# Patient Record
Sex: FEMALE | Race: WHITE | NOT HISPANIC OR LATINO | Employment: FULL TIME | URBAN - METROPOLITAN AREA
[De-identification: names, ages, dates, MRNs, and addresses within clinical notes are randomized per-mention and may not be internally consistent; named-entity substitution may affect disease eponyms.]

---

## 2023-08-23 PROBLEM — N95.2 ATROPHIC VAGINITIS: Status: ACTIVE | Noted: 2023-08-23

## 2023-08-23 PROBLEM — K64.4 EXTERNAL HEMORRHOID: Status: ACTIVE | Noted: 2023-08-23

## 2023-08-23 PROBLEM — K59.09 CHRONIC CONSTIPATION: Status: ACTIVE | Noted: 2023-08-23

## 2023-08-23 PROBLEM — R14.0 BLOATING: Status: ACTIVE | Noted: 2023-08-23

## 2023-08-23 PROBLEM — R10.11 ABDOMINAL PAIN, CHRONIC, RIGHT UPPER QUADRANT: Status: ACTIVE | Noted: 2023-08-23

## 2023-08-23 PROBLEM — R19.7 DIARRHEA: Status: ACTIVE | Noted: 2023-08-23

## 2023-08-23 PROBLEM — G89.29 ABDOMINAL PAIN, CHRONIC, RIGHT UPPER QUADRANT: Status: ACTIVE | Noted: 2023-08-23

## 2023-08-23 PROBLEM — R10.13 EPIGASTRIC PAIN: Status: ACTIVE | Noted: 2023-08-23

## 2023-08-23 PROBLEM — R32 URINARY INCONTINENCE: Status: ACTIVE | Noted: 2023-08-23

## 2023-08-23 PROBLEM — K44.9 HIATAL HERNIA: Status: ACTIVE | Noted: 2023-08-23

## 2023-08-23 PROBLEM — R15.9 FECAL INCONTINENCE: Status: ACTIVE | Noted: 2023-08-23

## 2023-08-23 PROBLEM — M85.80 OSTEOPENIA: Status: ACTIVE | Noted: 2023-08-23

## 2023-08-23 PROBLEM — K22.10 ESOPHAGEAL ULCER: Status: ACTIVE | Noted: 2023-08-23

## 2023-08-23 PROBLEM — K21.9 GERD (GASTROESOPHAGEAL REFLUX DISEASE): Status: ACTIVE | Noted: 2023-08-23

## 2023-08-23 PROBLEM — K64.8 PROLAPSED INTERNAL HEMORRHOIDS: Status: ACTIVE | Noted: 2023-08-23

## 2023-08-23 RX ORDER — CYCLOBENZAPRINE HCL 5 MG
1 TABLET ORAL 3 TIMES DAILY
COMMUNITY
Start: 2020-12-23

## 2023-08-23 RX ORDER — ACETAMINOPHEN 500 MG
1 TABLET ORAL DAILY
COMMUNITY

## 2023-08-23 RX ORDER — DEXTROAMPHETAMINE SACCHARATE, AMPHETAMINE ASPARTATE, DEXTROAMPHETAMINE SULFATE AND AMPHETAMINE SULFATE 2.5; 2.5; 2.5; 2.5 MG/1; MG/1; MG/1; MG/1
1 TABLET ORAL SEE ADMIN INSTRUCTIONS
COMMUNITY
Start: 2017-05-08

## 2023-08-23 RX ORDER — ACETAMINOPHEN 325 MG/1
650 TABLET ORAL EVERY 6 HOURS
COMMUNITY
Start: 2020-12-23

## 2023-08-23 RX ORDER — IBUPROFEN 600 MG/1
1 TABLET ORAL EVERY 6 HOURS
COMMUNITY
Start: 2020-12-23 | End: 2024-01-15 | Stop reason: WASHOUT

## 2023-08-23 RX ORDER — PANTOPRAZOLE SODIUM 40 MG/1
1 TABLET, DELAYED RELEASE ORAL 2 TIMES DAILY
COMMUNITY
End: 2024-01-15 | Stop reason: WASHOUT

## 2023-08-23 RX ORDER — ESCITALOPRAM OXALATE 20 MG/1
1 TABLET ORAL DAILY
COMMUNITY

## 2023-08-23 RX ORDER — DICYCLOMINE HYDROCHLORIDE 20 MG/1
1 TABLET ORAL
COMMUNITY
Start: 2019-08-09 | End: 2024-01-15 | Stop reason: WASHOUT

## 2023-08-23 RX ORDER — DOCUSATE SODIUM 100 MG/1
1 CAPSULE, LIQUID FILLED ORAL 2 TIMES DAILY
COMMUNITY
Start: 2020-12-23 | End: 2024-01-15 | Stop reason: WASHOUT

## 2023-08-23 RX ORDER — DICYCLOMINE HYDROCHLORIDE 10 MG/1
1 CAPSULE ORAL SEE ADMIN INSTRUCTIONS
COMMUNITY
Start: 2021-06-23 | End: 2024-01-15 | Stop reason: WASHOUT

## 2023-10-11 ENCOUNTER — APPOINTMENT (OUTPATIENT)
Dept: GASTROENTEROLOGY | Facility: CLINIC | Age: 73
End: 2023-10-11
Payer: COMMERCIAL

## 2023-10-12 ENCOUNTER — APPOINTMENT (OUTPATIENT)
Dept: GASTROENTEROLOGY | Facility: CLINIC | Age: 73
End: 2023-10-12
Payer: COMMERCIAL

## 2024-01-15 ENCOUNTER — TELEMEDICINE (OUTPATIENT)
Dept: PRIMARY CARE | Facility: CLINIC | Age: 74
End: 2024-01-15
Payer: COMMERCIAL

## 2024-01-15 DIAGNOSIS — J06.9 UPPER RESPIRATORY TRACT INFECTION, UNSPECIFIED TYPE: Primary | ICD-10-CM

## 2024-01-15 PROCEDURE — 99213 OFFICE O/P EST LOW 20 MIN: CPT | Performed by: STUDENT IN AN ORGANIZED HEALTH CARE EDUCATION/TRAINING PROGRAM

## 2024-01-15 RX ORDER — DOXYCYCLINE 100 MG/1
100 CAPSULE ORAL 2 TIMES DAILY
Qty: 14 CAPSULE | Refills: 0 | Status: SHIPPED | OUTPATIENT
Start: 2024-01-15 | End: 2024-01-22

## 2024-01-15 NOTE — PROGRESS NOTES
Subjective   Patient ID: Jacque Bautista is a 73 y.o. female who presents for No chief complaint on file..      HPI  1.  URI  Reports symptoms of productive cough, Chest congestion, and sinus congestion as well as malaise.  Symptoms started after Vicky  Went to urgent care for symptoms  Was started on Augmentin but didn't finish the course because she got diarrhea 3 days in and did not feel better  Has not felt better since she initially got sick    Review of Systems  All pertinent positive symptoms are included in the history of present illness.    All other systems have been reviewed and are negative and noncontributory to this patient's current ailments.    Past Medical History:   Diagnosis Date    Personal history of other endocrine, nutritional and metabolic disease     History of diabetes mellitus    Personal history of other mental and behavioral disorders     History of depression     Past Surgical History:   Procedure Laterality Date    RHINOPLASTY  11/12/2015    Rhinoplasty    TONSILLECTOMY  11/06/2015    Tonsillectomy        No family history on file.  Immunization History   Administered Date(s) Administered    Flu vaccine, quadrivalent, high-dose, preservative free, age 65y+ (FLUZONE) 09/27/2020, 10/23/2021, 10/29/2022    Influenza, High Dose Seasonal, Preservative Free 10/26/2018, 10/28/2019    Teresa SARS-CoV-2 Vaccination 05/09/2021    Pneumococcal polysaccharide vaccine, 23-valent, age 2 years and older (PNEUMOVAX 23) 12/22/2020, 12/23/2020     Current Outpatient Medications   Medication Instructions    acetaminophen (TYLENOL) 650 mg, oral, Every 6 hours    amphetamine-dextroamphetamine (Adderall) 10 mg tablet 1 tablet, oral, See admin instructions, Take 1 capsule in the morning and 1 tablet at noon    cholecalciferol (Vitamin D-3) 50 mcg (2,000 unit) capsule 1 capsule, oral, Daily    cyclobenzaprine (Flexeril) 5 mg tablet 1 tablet, oral, 3 times daily    dicyclomine (Bentyl) 10 mg capsule 1  capsule, oral, See admin instructions, UP to 2 times a day or up to 4 times a day as needed for abdominal cramping     dicyclomine (Bentyl) 20 mg tablet 1 tablet, oral, 3 times daily with meals    docusate sodium (Colace) 100 mg capsule 1 capsule, oral, 2 times daily    escitalopram (Lexapro) 20 mg tablet 1 tablet, oral, Daily    escitalopram oxalate (LEXAPRO ORAL) 30 mg, oral, Daily    ibuprofen 600 mg tablet 1 tablet, oral, Every 6 hours    pantoprazole (ProtoNix) 40 mg EC tablet 1 tablet, oral, 2 times daily    ZINC ORAL oral, Daily, gummies     No Known Allergies    Objective   There were no vitals filed for this visit.  There is no height or weight on file to calculate BMI.    BP Readings from Last 3 Encounters:   08/30/23 (!) 141/93   07/21/23 150/65   07/10/23 120/78      Wt Readings from Last 3 Encounters:   08/30/23 69.5 kg (153 lb 5 oz)   07/21/23 70.6 kg (155 lb 9.6 oz)   07/10/23 72.6 kg (160 lb)        No visits with results within 1 Month(s) from this visit.   Latest known visit with results is:   [facility] Encounter on 06/24/2021   Component Date Value    Yersinia Enterocolitica 06/24/2021 NOT DETECTED     Campylobacter gp. 06/24/2021 NOT DETECTED     Salmonella sp. 06/24/2021 NOT DETECTED     Shigella sp. 06/24/2021 NOT DETECTED     Vibrio grp. 06/24/2021 NOT DETECTED     Shiga Toxin 1 06/24/2021 NOT DETECTED     Shiga Toxin 2 06/24/2021 NOT DETECTED     Norovirus GI/GII 06/24/2021 NOT DETECTED     Rotavirus A 06/24/2021 NOT DETECTED      Physical Exam  CONSTITUTIONAL - well nourished, well developed, looks like stated age, in no acute distress, not ill-appearing, and not tired appearing  SKIN - normal skin color and pigmentation  EYES - normal external exam  LUNGS - breathing comfortably, no dyspnea  EXTREMITIES - no deformities noticeable  NEUROLOGICAL - oriented and no focal signs  PSYCHIATRIC - alert, pleasant and cordial, age-appropriate, not anxious or depressed appearing     Assessment/Plan    URI  Doxycycline 100mg BID x 7 days prescribed

## 2024-01-16 NOTE — ADDENDUM NOTE
Addended by: DEVON WILLINGHAM on: 1/15/2024 10:48 PM     Modules accepted: Orders, Level of Service

## 2024-08-21 ENCOUNTER — APPOINTMENT (OUTPATIENT)
Dept: SURGERY | Facility: CLINIC | Age: 74
End: 2024-08-21
Payer: COMMERCIAL

## 2024-08-21 VITALS
BODY MASS INDEX: 26.57 KG/M2 | HEART RATE: 88 BPM | DIASTOLIC BLOOD PRESSURE: 85 MMHG | SYSTOLIC BLOOD PRESSURE: 158 MMHG | OXYGEN SATURATION: 93 % | WEIGHT: 150 LBS

## 2024-08-21 DIAGNOSIS — K62.3 RECTAL PROLAPSE: ICD-10-CM

## 2024-08-21 DIAGNOSIS — R15.9 INCONTINENCE OF FECES, UNSPECIFIED FECAL INCONTINENCE TYPE: Primary | ICD-10-CM

## 2024-08-21 PROCEDURE — 99213 OFFICE O/P EST LOW 20 MIN: CPT | Performed by: SURGERY

## 2024-08-21 NOTE — PROGRESS NOTES
General Surgery Follow-Up Note    Referring Provider:   Fuentes Pathak DO      Chief Complaint:  Rectal prolapse    History of Present Illness:  This is a 74 y.o. female who presents for concerns for rectal prolapse.  She was last seen 1 year ago.  At that time, we discussed her chronic intermittent constipation and diarrhea.  We discussed this prolapsing tissue, but discussed that I believed she was having prolapsing grade 3-4 internal hemorrhoids at the time.  However, she has not had any hemorrhoid intervention or started any fiber supplements.  She is also convinced that this is actual rectal prolapse.  She reports that she often ices her anus to reduce tissue.  She reports that her last colonoscopy was in 2015.      Vitals:   Vitals:    08/21/24 1452   BP: 158/85   Pulse: 88   SpO2: 93%   Weight: 68 kg (150 lb)         Physical Exam:  General: No acute distress. Sitting up in bed.   Neuro: Alert and oriented ×3. Follows commands.  Head: Atraumatic  Eyes: Pupils equal reactive to light. Extraocular motions intact.  Ears: Hears normal speaking voice.  Mouth, Nose, Throat: Mucous membranes moist.  Normal dentition.  Neck: Supple. No appreciable masses.  Chest: No crepitus.  No appreciable scars.  Heart: Regular rate and rhythm.  Lung: Clear to auscultation bilaterally.  Vascular: No carotid bruits.  Palpable radial pulses bilaterally.  Abdomen: Soft. Nondistended. Nontender.  No appreciable hernias or scars.  Rectal: Enlarged right anterior hemorrhoid column.  Musculoskeletal: Moves all extremities.  Normal range of motion.  Lymphatic: No palpable lymph nodes.  Skin: No rashes or lesions.  Psychological: Normal affect    Labs:  None      Imaging:   None      Assessment:  This is a 74 y.o.-year-old female who presents for concerns for rectal prolapse.  We discussed that this could still be large prolapsing internal hemorrhoids.  However, cannot completely rule out rectal prolapse at this time.  We discussed that in  general she would need a colonoscopy.  However, given the concerns for rectal prolapse, I would recommend that she see a colorectal surgeon for workup.      Plan:  -- Referral to colorectal surgery    Basilio Pascal MD  General Surgery  Office: [phone]  Fax: [phone]  3:08 PM  08/21/24        Past Medical History:  Past Medical History:   Diagnosis Date    Personal history of other endocrine, nutritional and metabolic disease     History of diabetes mellitus    Personal history of other mental and behavioral disorders     History of depression        Past Surgical History:  Past Surgical History:   Procedure Laterality Date    RHINOPLASTY  11/12/2015    Rhinoplasty    TONSILLECTOMY  11/06/2015    Tonsillectomy        Medications:  Current Outpatient Medications   Medication Instructions    acetaminophen (TYLENOL) 650 mg, oral, Every 6 hours    amphetamine-dextroamphetamine (Adderall) 10 mg tablet 1 tablet, oral, See admin instructions, Take 1 capsule in the morning and 1 tablet at noon    cholecalciferol (Vitamin D-3) 50 mcg (2,000 unit) capsule 1 capsule, oral, Daily    cyclobenzaprine (Flexeril) 5 mg tablet 1 tablet, oral, 3 times daily    escitalopram (Lexapro) 20 mg tablet 1 tablet, oral, Daily    escitalopram oxalate (LEXAPRO ORAL) 30 mg, oral, Daily    ZINC ORAL oral, Daily, gummies        Allergies:  No Known Allergies     Family History:  No family history on file.     Social History:  Social History     Socioeconomic History    Marital status:         Review of Systems:  A complete 12 point review of systems was performed and is negative except as noted in the history of present illness.

## 2024-10-25 ENCOUNTER — OFFICE VISIT (OUTPATIENT)
Dept: SURGERY | Facility: CLINIC | Age: 74
End: 2024-10-25
Payer: COMMERCIAL

## 2024-10-25 VITALS
BODY MASS INDEX: 26.04 KG/M2 | TEMPERATURE: 96.8 F | DIASTOLIC BLOOD PRESSURE: 89 MMHG | HEART RATE: 87 BPM | WEIGHT: 147 LBS | SYSTOLIC BLOOD PRESSURE: 161 MMHG

## 2024-10-25 DIAGNOSIS — K62.3 RECTAL PROLAPSE: ICD-10-CM

## 2024-10-25 DIAGNOSIS — R15.9 INCONTINENCE OF FECES, UNSPECIFIED FECAL INCONTINENCE TYPE: ICD-10-CM

## 2024-10-25 PROCEDURE — 46600 DIAGNOSTIC ANOSCOPY SPX: CPT | Performed by: NURSE PRACTITIONER

## 2024-10-25 PROCEDURE — 99213 OFFICE O/P EST LOW 20 MIN: CPT | Performed by: NURSE PRACTITIONER

## 2024-10-25 ASSESSMENT — PAIN SCALES - GENERAL: PAINLEVEL_OUTOF10: 0-NO PAIN

## 2024-10-25 NOTE — PROGRESS NOTES
History Of Present Illness  Jacque Bautista is a 74 y.o. female presenting with fecal incontinence and a rectal prolapse.      Colonoscopy 2015 negative    She is seeing Dr. Richardson in December for a rectal prolapse.    She has been having anal  pain over the past months with minimal rectal bleeding.  She will have leakage of stool daily but no full accident of stool.  She has a lot of mucus drainage daily.      She will have a BM 2x/day and takes an Nani capsule daily to help her have better BM's.  She will take Colace at times.  No c/o any diarrhea.      No hx of any perianal surgeries.  She has had a few rubber band ligation procedures many years ago.  She has had 2 vaginal births with no tears.       Past Medical History  She has a past medical history of Personal history of other endocrine, nutritional and metabolic disease and Personal history of other mental and behavioral disorders.    Surgical History  She has a past surgical history that includes Tonsillectomy (11/06/2015) and Rhinoplasty (11/12/2015).     Social History  She has no history on file for tobacco use, alcohol use, and drug use.    Family History  No family history on file.     Allergies  Patient has no known allergies.    Review of Systems   All other systems reviewed and are negative.       Physical Exam  Constitutional:       Appearance: Normal appearance.   HENT:      Head: Normocephalic and atraumatic.   Pulmonary:      Effort: Pulmonary effort is normal.   Musculoskeletal:         General: Normal range of motion.   Skin:     General: Skin is warm and dry.   Neurological:      General: No focal deficit present.      Mental Status: She is alert and oriented to person, place, and time.   Psychiatric:         Mood and Affect: Mood normal.         Behavior: Behavior normal.          Anoscopy    Date/Time: 10/25/2024 3:01 PM    Performed by: CHRIS Casey  Authorized by: CHRIS Casey    Consent:     Consent obtained:   Verbal    Consent given by:  Patient    Risks, benefits, and alternatives were discussed: yes    Universal protocol:     Procedure explained and questions answered to patient or proxy's satisfaction: yes      Patient identity confirmed:  Verbally with patient  Post-procedure details:     Procedure completion:  Tolerated  Comments:      No external hemorrhoids.  Weak tone on KIM, no pain.  ON anoscopy, looking in 360 degrees, no irritation of the internal hemorrhoids.  She has redundant rectal tissue.  No pain at this time or active bleeding.  I also had her sit on the toilet to strain and she was able to reproduce a full rectal prolapse.  It was easily reproducible.      Assessment/Plan   Jacque has a full rectal prolapse.  She will continue to keep her stools soft so that she does not have to strain.  She has an appointment to see Dr. Richardson soon to talk about surgery.  We talked about the s/sx of an incarcerated rectal prolapse and she understands.  She will call with any issues and will follow up with Dr. Richardson.       Prachi Mccarty, APRN-CNP

## 2024-10-30 ENCOUNTER — APPOINTMENT (OUTPATIENT)
Dept: SURGERY | Facility: CLINIC | Age: 74
End: 2024-10-30
Payer: COMMERCIAL

## 2024-11-12 ENCOUNTER — APPOINTMENT (OUTPATIENT)
Dept: SURGERY | Facility: CLINIC | Age: 74
End: 2024-11-12
Payer: COMMERCIAL

## 2024-12-11 ENCOUNTER — APPOINTMENT (OUTPATIENT)
Dept: SURGERY | Facility: CLINIC | Age: 74
End: 2024-12-11
Payer: COMMERCIAL

## 2024-12-31 ENCOUNTER — OFFICE VISIT (OUTPATIENT)
Dept: URGENT CARE | Age: 74
End: 2024-12-31
Payer: COMMERCIAL

## 2024-12-31 VITALS
RESPIRATION RATE: 17 BRPM | HEART RATE: 83 BPM | TEMPERATURE: 97.2 F | SYSTOLIC BLOOD PRESSURE: 159 MMHG | OXYGEN SATURATION: 96 % | DIASTOLIC BLOOD PRESSURE: 85 MMHG | WEIGHT: 140 LBS | BODY MASS INDEX: 24.8 KG/M2

## 2024-12-31 DIAGNOSIS — N39.0 URINARY TRACT INFECTION WITH HEMATURIA, SITE UNSPECIFIED: Primary | ICD-10-CM

## 2024-12-31 DIAGNOSIS — R31.9 URINARY TRACT INFECTION WITH HEMATURIA, SITE UNSPECIFIED: Primary | ICD-10-CM

## 2024-12-31 DIAGNOSIS — R30.0 DYSURIA: ICD-10-CM

## 2024-12-31 LAB
POC APPEARANCE, URINE: ABNORMAL
POC BILIRUBIN, URINE: NEGATIVE
POC BLOOD, URINE: ABNORMAL
POC COLOR, URINE: YELLOW
POC GLUCOSE, URINE: NEGATIVE MG/DL
POC KETONES, URINE: NEGATIVE MG/DL
POC LEUKOCYTES, URINE: ABNORMAL
POC NITRITE,URINE: POSITIVE
POC PH, URINE: 6 PH
POC PROTEIN, URINE: NEGATIVE MG/DL
POC SPECIFIC GRAVITY, URINE: 1.02
POC UROBILINOGEN, URINE: 0.2 EU/DL

## 2024-12-31 PROCEDURE — 1159F MED LIST DOCD IN RCRD: CPT

## 2024-12-31 PROCEDURE — 1160F RVW MEDS BY RX/DR IN RCRD: CPT

## 2024-12-31 PROCEDURE — 87086 URINE CULTURE/COLONY COUNT: CPT

## 2024-12-31 PROCEDURE — 87186 SC STD MICRODIL/AGAR DIL: CPT

## 2024-12-31 PROCEDURE — 99214 OFFICE O/P EST MOD 30 MIN: CPT

## 2024-12-31 PROCEDURE — 81003 URINALYSIS AUTO W/O SCOPE: CPT

## 2024-12-31 RX ORDER — CEPHALEXIN 500 MG/1
500 CAPSULE ORAL 2 TIMES DAILY
Qty: 14 CAPSULE | Refills: 0 | Status: SHIPPED | OUTPATIENT
Start: 2024-12-31 | End: 2025-01-07

## 2024-12-31 ASSESSMENT — ENCOUNTER SYMPTOMS: DYSURIA: 1

## 2024-12-31 NOTE — PROGRESS NOTES
Subjective   Patient ID: Jacque Bautista is a 74 y.o. female. They present today with a chief complaint of Difficulty Urinating (For 3 days, believes she may have a UTI, painful urination, urine is cloudy, seems to be worsening.).    History of Present Illness  HPI    Past Medical History  Allergies as of 12/31/2024    (No Known Allergies)       (Not in a hospital admission)       Past Medical History:   Diagnosis Date    Personal history of other endocrine, nutritional and metabolic disease     History of diabetes mellitus    Personal history of other mental and behavioral disorders     History of depression       Past Surgical History:   Procedure Laterality Date    RHINOPLASTY  11/12/2015    Rhinoplasty    TONSILLECTOMY  11/06/2015    Tonsillectomy            Review of Systems  Review of Systems   Genitourinary:  Positive for dysuria and urgency.   All other systems reviewed and are negative.                                 Objective    Vitals:    12/31/24 0830   BP: 159/85   BP Location: Left arm   Patient Position: Sitting   BP Cuff Size: Small adult   Pulse: 83   Resp: 17   Temp: 36.2 °C (97.2 °F)   TempSrc: Temporal   SpO2: 96%   Weight: 63.5 kg (140 lb)     No LMP recorded.    Physical Exam  Vitals reviewed.   Constitutional:       Appearance: Normal appearance.   HENT:      Head: Normocephalic and atraumatic.   Cardiovascular:      Rate and Rhythm: Normal rate and regular rhythm.      Pulses: Normal pulses.      Heart sounds: Normal heart sounds.   Pulmonary:      Effort: Pulmonary effort is normal.      Breath sounds: Normal breath sounds.   Abdominal:      General: Abdomen is flat. Bowel sounds are normal.      Palpations: Abdomen is soft.   Musculoskeletal:         General: Normal range of motion.      Cervical back: Normal range of motion.   Skin:     General: Skin is warm.      Capillary Refill: Capillary refill takes less than 2 seconds.   Neurological:      General: No focal deficit present.       Mental Status: She is alert and oriented to person, place, and time.   Psychiatric:         Mood and Affect: Mood normal.         Behavior: Behavior normal.         Procedures    Point of Care Test & Imaging Results from this visit  No results found for this visit on 12/31/24.   No results found.    Diagnostic study results (if any) were reviewed by CHRIS Pisano.    Assessment/Plan   Allergies, medications, history, and pertinent labs/EKGs/Imaging reviewed by CHRIS Pisano.     Medical Decision Making  74-year-old female reports for mild dysuria and urgency.  Denies CVA tenderness or back pain or abdominal pain.  Patient denies fever or chills.  On exam patient is in no acute distress vital signs are stable heart rate is regular there is no CVA tenderness abdomen is soft nontender bowel sounds are positive UA shows leukocytes positive and nitrites and mild blood.  Patient to start Keflex as directed.  Patient to go to the emergency department with worsening symptoms.  Will culture urine.  Patient left in stable condition.    Orders and Diagnoses  Diagnoses and all orders for this visit:  Dysuria  -     POCT UA Automated manually resulted      Medical Admin Record      Patient disposition: Home    Electronically signed by CHRIS Pisano  8:38 AM

## 2024-12-31 NOTE — PATIENT INSTRUCTIONS
Start Cephalexin as directed, go to emergency department with worsening symptoms, otherwise follow up with primary care doctor.

## 2025-01-04 LAB — BACTERIA UR CULT: ABNORMAL

## 2025-01-19 ENCOUNTER — OFFICE VISIT (OUTPATIENT)
Dept: URGENT CARE | Age: 75
End: 2025-01-19
Payer: COMMERCIAL

## 2025-01-19 VITALS
OXYGEN SATURATION: 95 % | WEIGHT: 140 LBS | DIASTOLIC BLOOD PRESSURE: 84 MMHG | TEMPERATURE: 97.3 F | HEART RATE: 59 BPM | BODY MASS INDEX: 24.8 KG/M2 | RESPIRATION RATE: 16 BRPM | SYSTOLIC BLOOD PRESSURE: 125 MMHG

## 2025-01-19 DIAGNOSIS — R35.0 URINARY FREQUENCY: Primary | ICD-10-CM

## 2025-01-19 LAB
POC APPEARANCE, URINE: CLEAR
POC BILIRUBIN, URINE: NEGATIVE
POC BLOOD, URINE: NEGATIVE
POC COLOR, URINE: ABNORMAL
POC GLUCOSE, URINE: NEGATIVE MG/DL
POC KETONES, URINE: NEGATIVE MG/DL
POC LEUKOCYTES, URINE: ABNORMAL
POC NITRITE,URINE: POSITIVE
POC PH, URINE: 6 PH
POC PROTEIN, URINE: NEGATIVE MG/DL
POC SPECIFIC GRAVITY, URINE: 1.02
POC UROBILINOGEN, URINE: 0.2 EU/DL

## 2025-01-19 PROCEDURE — 87086 URINE CULTURE/COLONY COUNT: CPT

## 2025-01-19 PROCEDURE — 87186 SC STD MICRODIL/AGAR DIL: CPT

## 2025-01-19 RX ORDER — SULFAMETHOXAZOLE AND TRIMETHOPRIM 800; 160 MG/1; MG/1
1 TABLET ORAL 2 TIMES DAILY
Qty: 14 TABLET | Refills: 0 | Status: SHIPPED | OUTPATIENT
Start: 2025-01-19 | End: 2025-01-26

## 2025-01-19 ASSESSMENT — ENCOUNTER SYMPTOMS: FREQUENCY: 1

## 2025-01-19 NOTE — PATIENT INSTRUCTIONS
Start Bactrim as directed, go to emergency department with worsening symptoms, follow up with primary care doctor.

## 2025-01-19 NOTE — PROGRESS NOTES
Subjective   Patient ID: Jacque Bautista is a 74 y.o. female. They present today with a chief complaint of Urinary Frequency (For 3  weeks).    History of Present Illness  HPI    Past Medical History  Allergies as of 01/19/2025    (No Known Allergies)       (Not in a hospital admission)       Past Medical History:   Diagnosis Date    Personal history of other endocrine, nutritional and metabolic disease     History of diabetes mellitus    Personal history of other mental and behavioral disorders     History of depression       Past Surgical History:   Procedure Laterality Date    RHINOPLASTY  11/12/2015    Rhinoplasty    TONSILLECTOMY  11/06/2015    Tonsillectomy            Review of Systems  Review of Systems   Genitourinary:  Positive for frequency and urgency.   All other systems reviewed and are negative.                                 Objective    Vitals:    01/19/25 0926   BP: 125/84   BP Location: Left arm   Patient Position: Sitting   BP Cuff Size: Large adult   Pulse: 59   Resp: 16   Temp: 36.3 °C (97.3 °F)   TempSrc: Oral   SpO2: 95%   Weight: 63.5 kg (140 lb)     No LMP recorded.    Physical Exam  Vitals reviewed.   Constitutional:       Appearance: Normal appearance.   HENT:      Head: Normocephalic and atraumatic.      Right Ear: Tympanic membrane, ear canal and external ear normal.      Left Ear: Tympanic membrane, ear canal and external ear normal.      Nose: Nose normal.      Mouth/Throat:      Mouth: Mucous membranes are moist.      Pharynx: Oropharynx is clear.   Eyes:      Extraocular Movements: Extraocular movements intact.      Conjunctiva/sclera: Conjunctivae normal.      Pupils: Pupils are equal, round, and reactive to light.   Cardiovascular:      Rate and Rhythm: Normal rate and regular rhythm.      Pulses: Normal pulses.   Pulmonary:      Effort: Pulmonary effort is normal.      Breath sounds: Normal breath sounds.   Abdominal:      General: Abdomen is flat. Bowel sounds are normal.       Palpations: Abdomen is soft.   Musculoskeletal:         General: Normal range of motion.      Cervical back: Normal range of motion.   Skin:     General: Skin is warm.      Capillary Refill: Capillary refill takes less than 2 seconds.   Neurological:      General: No focal deficit present.      Mental Status: She is alert and oriented to person, place, and time.   Psychiatric:         Mood and Affect: Mood normal.         Behavior: Behavior normal.         Procedures    Point of Care Test & Imaging Results from this visit  No results found for this visit on 01/19/25.   No results found.    Diagnostic study results (if any) were reviewed by CHRIS Pisano.    Assessment/Plan   Allergies, medications, history, and pertinent labs/EKGs/Imaging reviewed by CHRIS Pisano.     Medical Decision Making  74-year-old female presents for urinary frequency.  She was diagnosed with UTI couple weeks ago and took Keflex.  She reports the symptoms got better but then they returned urine culture came back resistant to cephazolin.  Will prescribe Bactrim take as directed twice a day patient denies any fever or chills.  Patient denies dysuria patient reports she feels fine except for urinary frequency at times.  On exam she is in no acute distress vital signs are stable heart rate is regular lungs are clear there is no CVA tenderness no flank pain no abdominal pain patient is nontoxic-appearing she is afebrile patient encouraged to follow-up with her primary care doctor and go to the emergency department with any worsening symptoms patient agrees with plan of care patient left in stable condition  Orders and Diagnoses  Diagnoses and all orders for this visit:  Urinary frequency  -     POCT UA Automated manually resulted      Medical Admin Record      Patient disposition: Home    Electronically signed by CHRIS Pisano  9:41 AM

## 2025-01-21 ENCOUNTER — TELEPHONE (OUTPATIENT)
Dept: URGENT CARE | Age: 75
End: 2025-01-21

## 2025-01-22 LAB — BACTERIA UR CULT: ABNORMAL

## 2025-02-03 ENCOUNTER — OFFICE VISIT (OUTPATIENT)
Dept: PRIMARY CARE | Facility: CLINIC | Age: 75
End: 2025-02-03
Payer: COMMERCIAL

## 2025-02-03 VITALS
WEIGHT: 136 LBS | SYSTOLIC BLOOD PRESSURE: 142 MMHG | BODY MASS INDEX: 24.09 KG/M2 | TEMPERATURE: 97.7 F | DIASTOLIC BLOOD PRESSURE: 80 MMHG | RESPIRATION RATE: 18 BRPM | HEART RATE: 87 BPM | OXYGEN SATURATION: 96 %

## 2025-02-03 DIAGNOSIS — L30.9 DERMATITIS: ICD-10-CM

## 2025-02-03 DIAGNOSIS — Z13.1 DIABETES MELLITUS SCREENING: ICD-10-CM

## 2025-02-03 DIAGNOSIS — R73.9 BLOOD GLUCOSE ELEVATED: ICD-10-CM

## 2025-02-03 DIAGNOSIS — Z00.00 ROUTINE HEALTH MAINTENANCE: Primary | ICD-10-CM

## 2025-02-03 DIAGNOSIS — Z76.89 ESTABLISHING CARE WITH NEW DOCTOR, ENCOUNTER FOR: ICD-10-CM

## 2025-02-03 DIAGNOSIS — Z78.0 POSTMENOPAUSAL: ICD-10-CM

## 2025-02-03 DIAGNOSIS — Z12.31 BREAST CANCER SCREENING BY MAMMOGRAM: ICD-10-CM

## 2025-02-03 DIAGNOSIS — E78.00 HYPERCHOLESTEROLEMIA: ICD-10-CM

## 2025-02-03 DIAGNOSIS — M85.80 OSTEOPENIA, UNSPECIFIED LOCATION: ICD-10-CM

## 2025-02-03 DIAGNOSIS — N39.0 FREQUENT UTI: ICD-10-CM

## 2025-02-03 DIAGNOSIS — Z11.59 ENCOUNTER FOR HEPATITIS C SCREENING TEST FOR LOW RISK PATIENT: ICD-10-CM

## 2025-02-03 PROCEDURE — 99397 PER PM REEVAL EST PAT 65+ YR: CPT | Performed by: FAMILY MEDICINE

## 2025-02-03 PROCEDURE — 1160F RVW MEDS BY RX/DR IN RCRD: CPT | Performed by: FAMILY MEDICINE

## 2025-02-03 PROCEDURE — 1126F AMNT PAIN NOTED NONE PRSNT: CPT | Performed by: FAMILY MEDICINE

## 2025-02-03 PROCEDURE — 1159F MED LIST DOCD IN RCRD: CPT | Performed by: FAMILY MEDICINE

## 2025-02-03 PROCEDURE — 99213 OFFICE O/P EST LOW 20 MIN: CPT | Performed by: FAMILY MEDICINE

## 2025-02-03 RX ORDER — DOXYCYCLINE 100 MG/1
100 CAPSULE ORAL DAILY
Qty: 30 CAPSULE | Refills: 0 | Status: SHIPPED | OUTPATIENT
Start: 2025-02-03 | End: 2025-03-05

## 2025-02-03 RX ORDER — KETOCONAZOLE 20 MG/ML
SHAMPOO, SUSPENSION TOPICAL 2 TIMES WEEKLY
Qty: 120 ML | Refills: 0 | Status: SHIPPED | OUTPATIENT
Start: 2025-02-03

## 2025-02-03 ASSESSMENT — COLUMBIA-SUICIDE SEVERITY RATING SCALE - C-SSRS
1. IN THE PAST MONTH, HAVE YOU WISHED YOU WERE DEAD OR WISHED YOU COULD GO TO SLEEP AND NOT WAKE UP?: NO
6. HAVE YOU EVER DONE ANYTHING, STARTED TO DO ANYTHING, OR PREPARED TO DO ANYTHING TO END YOUR LIFE?: NO
2. HAVE YOU ACTUALLY HAD ANY THOUGHTS OF KILLING YOURSELF?: NO

## 2025-02-03 ASSESSMENT — ENCOUNTER SYMPTOMS
LOSS OF SENSATION IN FEET: 0
DEPRESSION: 0
OCCASIONAL FEELINGS OF UNSTEADINESS: 0

## 2025-02-03 ASSESSMENT — PAIN SCALES - GENERAL: PAINLEVEL_OUTOF10: 0-NO PAIN

## 2025-02-03 ASSESSMENT — PATIENT HEALTH QUESTIONNAIRE - PHQ9
SUM OF ALL RESPONSES TO PHQ9 QUESTIONS 1 AND 2: 0
1. LITTLE INTEREST OR PLEASURE IN DOING THINGS: NOT AT ALL
2. FEELING DOWN, DEPRESSED OR HOPELESS: NOT AT ALL

## 2025-02-03 NOTE — PROGRESS NOTES
Outpatient Visit Note    Chief Complaint   Patient presents with    blood work       HPI:  Jacque Bautista is a 74 y.o. female here  for a complete physical and to establish care.    Previous PCP is Dr. Pathak/Dalton    She is following closely with the colorectal surgeon for rectal prolapse. Having surgery soon.     Health Maintenance.  Immunizations: Tdap is due at this time.  Influenza vaccine is due.  Pneumococcal vaccination is up-to-date.  Shingrix is due.    She does vape infrequently. Denies illicit drug use, drinks alcoholic beverages rarely. Patient reports routine vision checks and dental cleanings, and does not get regular exercise. Patient is not fasting for routine blood work today.    Screening colonoscopy done in 2025 with no need to repeat for screening purposes. Screening pap N/A, screening mammogram is due.     Otherwise denies fevers, chills, cold/flu symptoms, SOB, CP, N/V, abdominal pain, dysuria, hematuria, melena, or LE edema    PHQ9/GAD7:         Patient Active Problem List    Diagnosis Date Noted    Rectal prolapse 10/25/2024    Abdominal pain, chronic, right upper quadrant 08/23/2023    Epigastric pain 08/23/2023    Atrophic vaginitis 08/23/2023    Bloating 08/23/2023    Chronic constipation 08/23/2023    Diarrhea 08/23/2023    Esophageal ulcer 08/23/2023    External hemorrhoid 08/23/2023    GERD (gastroesophageal reflux disease) 08/23/2023    Hiatal hernia 08/23/2023    Osteopenia 08/23/2023    Prolapsed internal hemorrhoids 08/23/2023    Fecal incontinence 08/23/2023    Urinary incontinence 08/23/2023        Past Medical History:   Diagnosis Date    Personal history of other endocrine, nutritional and metabolic disease     History of diabetes mellitus    Personal history of other mental and behavioral disorders     History of depression        Current Medications  Current Outpatient Medications   Medication Instructions    doxycycline (VIBRAMYCIN) 100 mg, oral, Daily, Take with at  least 8 ounces (large glass) of water, do not lie down for 30 minutes after. Take with food.    ketoconazole (NIZOral) 2 % shampoo Topical, 2 times weekly, Shampoo daily, leave on for 5-10 minutes, then rinse. For 3 weeks    NON FORMULARY Balance of nature  capsule 6 capsules daily    NON FORMULARY K - 3    vitamin daily        Allergies  No Known Allergies     Immunizations  Immunization History   Administered Date(s) Administered    Flu vaccine, quadrivalent, high-dose, preservative free, age 65y+ (FLUZONE) 09/27/2020, 10/23/2021, 10/29/2022, 11/27/2023    Flu vaccine, trivalent, preservative free, HIGH-DOSE, age 65y+ (Fluzone) 10/26/2018, 10/28/2019    Teresa SARS-CoV-2 Vaccination 05/09/2021    Pneumococcal polysaccharide vaccine, 23-valent, age 2 years and older (PNEUMOVAX 23) 12/22/2020, 12/23/2020        Past Surgical History:   Procedure Laterality Date    RHINOPLASTY  11/12/2015    Rhinoplasty    TONSILLECTOMY  11/06/2015    Tonsillectomy     No family history on file.  Social History     Tobacco Use    Smoking status: Never    Smokeless tobacco: Never   Vaping Use    Vaping status: Some Days   Substance Use Topics    Alcohol use: Never    Drug use: Never     Tobacco Use: Low Risk  (2/3/2025)    Patient History     Smoking Tobacco Use: Never     Smokeless Tobacco Use: Never     Passive Exposure: Not on file   Recent Concern: Tobacco Use - Medium Risk (1/28/2025)    Received from [facility]    Patient History     Smoking Tobacco Use: Former     Smokeless Tobacco Use: Unknown     Passive Exposure: Not on file        ROS  All pertinent positive symptoms are included in the history of present illness.  All other systems have been reviewed and are negative and noncontributory to this patient's current ailments.    VITAL SIGNS  Vitals:    02/03/25 1537   BP: 142/80   Pulse: 87   Resp: 18   Temp: 36.5 °C (97.7 °F)   SpO2: 96%     Vitals:    02/03/25 1537   Weight: 61.7 kg (136 lb)      Body mass index is  24.09 kg/m².     PHYSICAL EXAM  GENERAL APPEARANCE: well nourished, well developed, looks like stated age, in no acute distress, not ill or tired appearing, conversing well.   HEENT: no trauma, normocephalic. PERRLA and EOMI with normal external exam. TM's intact with no injection or effusion, no signs of infection.   NECK: no nodes, supple without rigidity, no neck mass was observed, no thyromegaly or thyroid nodules.   HEART: regular rate and rhythm, S1 and S2 heard with no murmurs or skipped beats, no carotid bruits.   LUNGS: clear to auscultation bilaterally with no wheezes, crackles or rales.   ABDOMEN: no organomegaly, soft, nontender, nondistended, normal bowel sounds, no guarding/rebound/rigidity.   EXTREMITIES: moving all extremities equally with no edema or deformities.   SKIN: normal skin color and pigmentation, normal skin turgor with dry skin patches diffusely on chest, arms and back.   NEUROLOGIC EXAM: CN II-XII grossly intact, normal gait, normal balance, 5/5 muscle strength  PSYCH: mood and affect appropriate; alert and oriented to time, place, person; no difficulty with speech or language.   LYMPH NODES: no cervical lymphadenopathy.         Counseling:       Medication education:           Education:  The patient is counseled regarding potential side-effects of all new medications          Understanding:  Patient expressed understanding of information conveyed today          Adherence:  No barriers to adherence identified      Assessment/Plan   Problem List Items Addressed This Visit             ICD-10-CM    Osteopenia M85.80    Relevant Orders    Comprehensive Metabolic Panel    Lipid Panel    CBC    TSH with reflex to Free T4 if abnormal     Other Visit Diagnoses         Codes    Routine health maintenance    -  Primary Z00.00    Relevant Orders    Comprehensive Metabolic Panel    Lipid Panel    CBC    TSH with reflex to Free T4 if abnormal    Hepatitis C Antibody    Establishing care with new  doctor, encounter for     Z76.89    Relevant Orders    Comprehensive Metabolic Panel    Lipid Panel    CBC    TSH with reflex to Free T4 if abnormal    Blood glucose elevated     R73.9    Relevant Orders    Comprehensive Metabolic Panel    Lipid Panel    CBC    TSH with reflex to Free T4 if abnormal    Diabetes mellitus screening     Z13.1    Relevant Orders    Comprehensive Metabolic Panel    Lipid Panel    CBC    TSH with reflex to Free T4 if abnormal    Hypercholesterolemia     E78.00    Relevant Orders    Comprehensive Metabolic Panel    Lipid Panel    CBC    TSH with reflex to Free T4 if abnormal    Encounter for hepatitis C screening test for low risk patient     Z11.59    Relevant Orders    Hepatitis C Antibody    Breast cancer screening by mammogram     Z12.31    Relevant Orders    BI mammo bilateral screening tomosynthesis    Postmenopausal     Z78.0    Relevant Orders    XR DEXA bone density    Frequent UTI     N39.0    Relevant Medications    doxycycline (Vibramycin) 100 mg capsule    Dermatitis     L30.9    Relevant Medications    ketoconazole (NIZOral) 2 % shampoo            Additional Visit Plans:  - Complete history and physical examination was performed      GENERAL RECOMMENDATIONS:  - I encourage you to eat a low-fat, moderate-carbohydrate, low-calorie diet to maintain a normal BMI (under 25) to reduce heart disease, and risk for diabetes  - Moderate intensity exercise for 30 minutes 5 days per week is recommended  - Helpful resources recommended by the American Academy of Family Practice can be found at www.familydoctor.org or www.choosemyplate.gov  - Can also consider enrolling in a program such as Weight Watchers or Padmini Zamora. The most effective diet is going to be one you can follow long term and turn into a lifestyle. The CDC recommends losing 0.5-2 lbs a week if overweight or obese.  - I recommend a routine vision check and dental cleanings every 6 months      BLOOD TESTING:  - We will  obtain routine blood work, please have this done when you are fasting. The office will notify you of the test results once they are available and make any treatment recommendations accordingly  - Blood work may include a cholesterol and diabetes screen if risk factors exist (overweight, high blood pressure etc); screening for sexually transmitted infections; and Hepatitis C Virus screening      VACCINATION RECOMMENDATIONS:  - Flu shot annually.  Due at this time, declined.   - Tetanus booster every 10 years.  Due at this time, please price this at the pharmacy and receive it there if affordable  - Two pneumonia vaccinations starting at 65 years old (or earlier if risk factors - smoker, diabetic, heart or lung conditions) -up-to-date  - Shingles vaccine for those 50 years or older -due at this time, please price is out of the pharmacy and receive it there if affordable      SCREENING RECOMMENDATIONS:  - Cervical cancer screening (pap test) in women starting at age 21 until age 65 years old.  Not applicable  - Mammogram screening for breast cancer in women starting at 40-50 years and every 1-2 years - Due  - Bone density screening (DEXA) for osteoporosis in women aged 65 years and older (in younger women who are higher risk) - due  - Colon cancer screening (with colonoscopy or Cologuard) for men and women starting at age 45 until 74 years old (or earlier if family history of colon cancer) - no more needed    With your stooling and mucus situation you have had a lot of UTIs. Lets cover with a daily Doxycycline (take with food) until you are able to have the surgery.     Plan to use the ketoconazole shampoo twice a week with a loofa. Apply moisturizing lotion after the shower daily. See if the dry patches go away.     Next Wellness Exam/Annual Physical Due  In 1 year from today    Patient Care Team:  Fuentes Pathak DO as PCP - General  Paul Hoffman MD as PCP - [facility]    Nima Haney DO   02/03/25   4:00 PM

## 2025-02-03 NOTE — PATIENT INSTRUCTIONS
Problem List Items Addressed This Visit             ICD-10-CM    Osteopenia M85.80    Relevant Orders    Comprehensive Metabolic Panel    Lipid Panel    CBC    TSH with reflex to Free T4 if abnormal     Other Visit Diagnoses         Codes    Routine health maintenance    -  Primary Z00.00    Relevant Orders    Comprehensive Metabolic Panel    Lipid Panel    CBC    TSH with reflex to Free T4 if abnormal    Hepatitis C Antibody    Establishing care with new doctor, encounter for     Z76.89    Relevant Orders    Comprehensive Metabolic Panel    Lipid Panel    CBC    TSH with reflex to Free T4 if abnormal    Blood glucose elevated     R73.9    Relevant Orders    Comprehensive Metabolic Panel    Lipid Panel    CBC    TSH with reflex to Free T4 if abnormal    Diabetes mellitus screening     Z13.1    Relevant Orders    Comprehensive Metabolic Panel    Lipid Panel    CBC    TSH with reflex to Free T4 if abnormal    Hypercholesterolemia     E78.00    Relevant Orders    Comprehensive Metabolic Panel    Lipid Panel    CBC    TSH with reflex to Free T4 if abnormal    Encounter for hepatitis C screening test for low risk patient     Z11.59    Relevant Orders    Hepatitis C Antibody    Breast cancer screening by mammogram     Z12.31    Relevant Orders    BI mammo bilateral screening tomosynthesis    Postmenopausal     Z78.0    Relevant Orders    XR DEXA bone density    Frequent UTI     N39.0    Relevant Medications    doxycycline (Vibramycin) 100 mg capsule    Dermatitis     L30.9    Relevant Medications    ketoconazole (NIZOral) 2 % shampoo            Additional Visit Plans:  - Complete history and physical examination was performed      GENERAL RECOMMENDATIONS:  - I encourage you to eat a low-fat, moderate-carbohydrate, low-calorie diet to maintain a normal BMI (under 25) to reduce heart disease, and risk for diabetes  - Moderate intensity exercise for 30 minutes 5 days per week is recommended  - Helpful resources recommended  by the American Academy of Family Practice can be found at www.familydoctor.org or www.choosemyplate.gov  - Can also consider enrolling in a program such as Weight Watchers or Padmini Zamora. The most effective diet is going to be one you can follow long term and turn into a lifestyle. The CDC recommends losing 0.5-2 lbs a week if overweight or obese.  - I recommend a routine vision check and dental cleanings every 6 months      BLOOD TESTING:  - We will obtain routine blood work, please have this done when you are fasting. The office will notify you of the test results once they are available and make any treatment recommendations accordingly  - Blood work may include a cholesterol and diabetes screen if risk factors exist (overweight, high blood pressure etc); screening for sexually transmitted infections; and Hepatitis C Virus screening      VACCINATION RECOMMENDATIONS:  - Flu shot annually.  Due at this time, declined.   - Tetanus booster every 10 years.  Due at this time, please price this at the pharmacy and receive it there if affordable  - Two pneumonia vaccinations starting at 65 years old (or earlier if risk factors - smoker, diabetic, heart or lung conditions) -up-to-date  - Shingles vaccine for those 50 years or older -due at this time, please price is out of the pharmacy and receive it there if affordable      SCREENING RECOMMENDATIONS:  - Cervical cancer screening (pap test) in women starting at age 21 until age 65 years old.  Not applicable  - Mammogram screening for breast cancer in women starting at 40-50 years and every 1-2 years - Due  - Bone density screening (DEXA) for osteoporosis in women aged 65 years and older (in younger women who are higher risk) - due  - Colon cancer screening (with colonoscopy or Cologuard) for men and women starting at age 45 until 74 years old (or earlier if family history of colon cancer) - no more needed    With your stooling and mucus situation you have had a lot of  UTIs. Lets cover with a daily Doxycycline (take with food) until you are able to have the surgery.     Plan to use the ketoconazole shampoo twice a week with a loofa. Apply moisturizing lotion after the shower daily. See if the dry patches go away.     Next Wellness Exam/Annual Physical Due  In 1 year from today    Patient Care Team:  Fuentes Pathak DO as PCP - General  [facility]    Nima Haney DO   02/03/25   4:00 PM

## 2025-02-11 LAB
ALBUMIN SERPL-MCNC: 4.8 G/DL (ref 3.6–5.1)
ALP SERPL-CCNC: 59 U/L (ref 37–153)
ALT SERPL-CCNC: 16 U/L (ref 6–29)
ANION GAP SERPL CALCULATED.4IONS-SCNC: 10 MMOL/L (CALC) (ref 7–17)
AST SERPL-CCNC: 21 U/L (ref 10–35)
BILIRUB SERPL-MCNC: 1.5 MG/DL (ref 0.2–1.2)
BUN SERPL-MCNC: 15 MG/DL (ref 7–25)
CALCIUM SERPL-MCNC: 9.6 MG/DL (ref 8.6–10.4)
CHLORIDE SERPL-SCNC: 103 MMOL/L (ref 98–110)
CHOLEST SERPL-MCNC: 265 MG/DL
CHOLEST/HDLC SERPL: 3.8 (CALC)
CO2 SERPL-SCNC: 26 MMOL/L (ref 20–32)
CREAT SERPL-MCNC: 0.68 MG/DL (ref 0.6–1)
EGFRCR SERPLBLD CKD-EPI 2021: 91 ML/MIN/1.73M2
ERYTHROCYTE [DISTWIDTH] IN BLOOD BY AUTOMATED COUNT: 13.1 % (ref 11–15)
GLUCOSE SERPL-MCNC: 172 MG/DL (ref 65–99)
HCT VFR BLD AUTO: 44.7 % (ref 35–45)
HCV AB SERPL QL IA: NORMAL
HDLC SERPL-MCNC: 69 MG/DL
HGB BLD-MCNC: 14 G/DL (ref 11.7–15.5)
LDLC SERPL CALC-MCNC: 171 MG/DL (CALC)
MCH RBC QN AUTO: 31.3 PG (ref 27–33)
MCHC RBC AUTO-ENTMCNC: 31.3 G/DL (ref 32–36)
MCV RBC AUTO: 99.8 FL (ref 80–100)
NONHDLC SERPL-MCNC: 196 MG/DL (CALC)
PLATELET # BLD AUTO: 329 THOUSAND/UL (ref 140–400)
PMV BLD REES-ECKER: 10.3 FL (ref 7.5–12.5)
POTASSIUM SERPL-SCNC: 4 MMOL/L (ref 3.5–5.3)
PROT SERPL-MCNC: 7.4 G/DL (ref 6.1–8.1)
RBC # BLD AUTO: 4.48 MILLION/UL (ref 3.8–5.1)
SODIUM SERPL-SCNC: 139 MMOL/L (ref 135–146)
TRIGL SERPL-MCNC: 120 MG/DL
TSH SERPL-ACNC: 1.71 MIU/L (ref 0.4–4.5)
WBC # BLD AUTO: 6.2 THOUSAND/UL (ref 3.8–10.8)

## 2025-02-21 ENCOUNTER — TELEPHONE (OUTPATIENT)
Dept: PRIMARY CARE | Facility: CLINIC | Age: 75
End: 2025-02-21

## 2025-02-27 ENCOUNTER — HOSPITAL ENCOUNTER (OUTPATIENT)
Dept: RADIOLOGY | Facility: HOSPITAL | Age: 75
Discharge: HOME | End: 2025-02-27
Payer: COMMERCIAL

## 2025-02-27 ENCOUNTER — APPOINTMENT (OUTPATIENT)
Dept: PRIMARY CARE | Facility: CLINIC | Age: 75
End: 2025-02-27
Payer: COMMERCIAL

## 2025-02-27 VITALS — HEIGHT: 63 IN | BODY MASS INDEX: 24.1 KG/M2 | WEIGHT: 136 LBS

## 2025-02-27 DIAGNOSIS — Z12.31 BREAST CANCER SCREENING BY MAMMOGRAM: ICD-10-CM

## 2025-02-27 DIAGNOSIS — Z78.0 POSTMENOPAUSAL: ICD-10-CM

## 2025-02-27 PROCEDURE — 77080 DXA BONE DENSITY AXIAL: CPT | Performed by: STUDENT IN AN ORGANIZED HEALTH CARE EDUCATION/TRAINING PROGRAM

## 2025-02-27 PROCEDURE — 77063 BREAST TOMOSYNTHESIS BI: CPT | Performed by: RADIOLOGY

## 2025-02-27 PROCEDURE — 77067 SCR MAMMO BI INCL CAD: CPT | Performed by: RADIOLOGY

## 2025-02-27 PROCEDURE — 77080 DXA BONE DENSITY AXIAL: CPT

## 2025-02-27 PROCEDURE — 77067 SCR MAMMO BI INCL CAD: CPT
